# Patient Record
Sex: FEMALE | Race: BLACK OR AFRICAN AMERICAN | URBAN - METROPOLITAN AREA
[De-identification: names, ages, dates, MRNs, and addresses within clinical notes are randomized per-mention and may not be internally consistent; named-entity substitution may affect disease eponyms.]

---

## 2020-06-09 ENCOUNTER — WEB ENCOUNTER (OUTPATIENT)
Dept: URBAN - METROPOLITAN AREA CLINIC 98 | Facility: CLINIC | Age: 55
End: 2020-06-09

## 2020-06-12 ENCOUNTER — TELEPHONE ENCOUNTER (OUTPATIENT)
Dept: URBAN - METROPOLITAN AREA CLINIC 92 | Facility: CLINIC | Age: 55
End: 2020-06-12

## 2020-08-11 ENCOUNTER — LAB OUTSIDE AN ENCOUNTER (OUTPATIENT)
Dept: URBAN - METROPOLITAN AREA CLINIC 98 | Facility: CLINIC | Age: 55
End: 2020-08-11

## 2020-08-12 LAB
A/G RATIO: 1.6
ALBUMIN: 4.5
ALKALINE PHOSPHATASE: 239
ALT (SGPT): 21
AST (SGOT): 31
BILIRUBIN, TOTAL: 0.6
BUN/CREATININE RATIO: 10
BUN: 9
CALCIUM: 9.8
CARBON DIOXIDE, TOTAL: 24
CHLORIDE: 103
CHOLESTEROL, TOTAL: 213
COMMENT:: (no result)
CREATININE: 0.87
EGFR IF AFRICN AM: 87
EGFR IF NONAFRICN AM: 76
GGT: 99
GLOBULIN, TOTAL: 2.9
GLUCOSE: 88
HDL CHOLESTEROL: 119
HEMATOCRIT: 40
HEMOGLOBIN: 13.5
LDL CHOLESTEROL CALC: 83
MCH: 29.6
MCHC: 33.8
MCV: 88
NRBC: (no result)
PLATELETS: 346
POTASSIUM: 4.4
PROTEIN, TOTAL: 7.4
RBC: 4.56
RDW: 12.7
SODIUM: 141
TRIGLYCERIDES: 57
VITAMIN D, 25-HYDROXY: 29.4
VLDL CHOLESTEROL CAL: 11
WBC: 3.6

## 2020-08-17 ENCOUNTER — OFFICE VISIT (OUTPATIENT)
Dept: URBAN - METROPOLITAN AREA CLINIC 98 | Facility: CLINIC | Age: 55
End: 2020-08-17
Payer: COMMERCIAL

## 2020-08-17 DIAGNOSIS — R79.89 LOW VITAMIN D LEVEL: ICD-10-CM

## 2020-08-17 DIAGNOSIS — E78.5 DYSLIPIDEMIA: ICD-10-CM

## 2020-08-17 DIAGNOSIS — D86.9 SARCOIDOSIS: ICD-10-CM

## 2020-08-17 PROCEDURE — G9903 PT SCRN TBCO ID AS NON USER: HCPCS | Performed by: INTERNAL MEDICINE

## 2020-08-17 PROCEDURE — 3017F COLORECTAL CA SCREEN DOC REV: CPT | Performed by: INTERNAL MEDICINE

## 2020-08-17 PROCEDURE — G8417 CALC BMI ABV UP PARAM F/U: HCPCS | Performed by: INTERNAL MEDICINE

## 2020-08-17 PROCEDURE — G8427 DOCREV CUR MEDS BY ELIG CLIN: HCPCS | Performed by: INTERNAL MEDICINE

## 2020-08-17 PROCEDURE — 99213 OFFICE O/P EST LOW 20 MIN: CPT | Performed by: INTERNAL MEDICINE

## 2020-08-17 RX ORDER — PRAVASTATIN SODIUM 20 MG/1
TAKE 1 TABLET (20 MG) BY ORAL ROUTE ONCE DAILY AT BEDTIME FOR 90 DAYS TABLET ORAL 1
Qty: 90 | Refills: 3 | Status: ACTIVE | COMMUNITY
Start: 2020-02-15 | End: 2021-02-09

## 2020-08-17 RX ORDER — URSODIOL 250 MG/1
TAKE 2 TABLETS BY ORAL ROUTE 2 TIMES A DAY FOR 90 DAYS TABLET ORAL 2
Qty: 360 | Refills: 3 | Status: ACTIVE | COMMUNITY
Start: 2020-02-14 | End: 2021-02-08

## 2020-08-17 NOTE — HPI-TODAY'S VISIT:
Here for routine f/u  grandmother passed she has been under stress  not eating as she should  working remotely no new medications or medical issues.

## 2020-08-18 ENCOUNTER — OFFICE VISIT (OUTPATIENT)
Dept: URBAN - METROPOLITAN AREA CLINIC 98 | Facility: CLINIC | Age: 55
End: 2020-08-18

## 2020-12-31 ENCOUNTER — ERX REFILL RESPONSE (OUTPATIENT)
Age: 55
End: 2020-12-31

## 2020-12-31 RX ORDER — PRAVASTATIN SODIUM 20 MG/1
TAKE 1 TABLET(20 MG) BY MOUTH EVERY DAY AT BEDTIME TABLET ORAL
Qty: 90 | Refills: 3

## 2021-02-11 LAB
A/G RATIO: 1.4
ALBUMIN: 4.5
ALKALINE PHOSPHATASE: 236
ALT (SGPT): 27
AST (SGOT): 37
BASO (ABSOLUTE): 0
BASOS: 1
BILIRUBIN, TOTAL: 0.6
BUN/CREATININE RATIO: 9
BUN: 8
CALCIUM: 9.9
CARBON DIOXIDE, TOTAL: 24
CHLORIDE: 105
CREATININE: 0.88
EGFR IF AFRICN AM: 86
EGFR IF NONAFRICN AM: 74
EOS (ABSOLUTE): 0.1
EOS: 2
GGT: 83
GLOBULIN, TOTAL: 3.2
GLUCOSE: 96
HEMATOCRIT: 39.9
HEMATOLOGY COMMENTS:: (no result)
HEMOGLOBIN: 13.1
IMMATURE CELLS: (no result)
IMMATURE GRANS (ABS): 0
IMMATURE GRANULOCYTES: 0
LYMPHS (ABSOLUTE): 1.1
LYMPHS: 33
MCH: 28.7
MCHC: 32.8
MCV: 87
MONOCYTES(ABSOLUTE): 0.4
MONOCYTES: 12
NEUTROPHILS (ABSOLUTE): 1.8
NEUTROPHILS: 52
NRBC: (no result)
PLATELETS: 363
POTASSIUM: 4.6
PROTEIN, TOTAL: 7.7
RBC: 4.57
RDW: 12.9
SODIUM: 145
VITAMIN D, 25-HYDROXY: 24.3
WBC: 3.4

## 2021-02-17 ENCOUNTER — LAB OUTSIDE AN ENCOUNTER (OUTPATIENT)
Dept: URBAN - METROPOLITAN AREA CLINIC 98 | Facility: CLINIC | Age: 56
End: 2021-02-17

## 2021-02-18 ENCOUNTER — OFFICE VISIT (OUTPATIENT)
Dept: URBAN - METROPOLITAN AREA CLINIC 98 | Facility: CLINIC | Age: 56
End: 2021-02-18
Payer: COMMERCIAL

## 2021-02-18 ENCOUNTER — ERX REFILL RESPONSE (OUTPATIENT)
Age: 56
End: 2021-02-18

## 2021-02-18 DIAGNOSIS — D86.9 SARCOIDOSIS: ICD-10-CM

## 2021-02-18 DIAGNOSIS — R79.89 LOW VITAMIN D LEVEL: ICD-10-CM

## 2021-02-18 DIAGNOSIS — E78.5 DYSLIPIDEMIA: ICD-10-CM

## 2021-02-18 PROCEDURE — G8482 FLU IMMUNIZE ORDER/ADMIN: HCPCS | Performed by: INTERNAL MEDICINE

## 2021-02-18 PROCEDURE — G8427 DOCREV CUR MEDS BY ELIG CLIN: HCPCS | Performed by: INTERNAL MEDICINE

## 2021-02-18 PROCEDURE — 99214 OFFICE O/P EST MOD 30 MIN: CPT | Performed by: INTERNAL MEDICINE

## 2021-02-18 PROCEDURE — G8420 CALC BMI NORM PARAMETERS: HCPCS | Performed by: INTERNAL MEDICINE

## 2021-02-18 PROCEDURE — 3017F COLORECTAL CA SCREEN DOC REV: CPT | Performed by: INTERNAL MEDICINE

## 2021-02-18 PROCEDURE — G9903 PT SCRN TBCO ID AS NON USER: HCPCS | Performed by: INTERNAL MEDICINE

## 2021-02-18 RX ORDER — ERGOCALCIFEROL 1.25 MG/1
TAKE 1 CAPSULE BY MOUTH EVERY WEEK CAPSULE, LIQUID FILLED ORAL
Qty: 8 | Refills: 0

## 2021-02-18 RX ORDER — PRAVASTATIN SODIUM 20 MG/1
TAKE 1 TABLET(20 MG) BY MOUTH EVERY DAY AT BEDTIME TABLET ORAL
Qty: 90 | Refills: 3 | Status: ACTIVE | COMMUNITY

## 2021-02-18 RX ORDER — AMLODIPINE BESYLATE 2.5 MG
1 TABLET TABLET ORAL ONCE A DAY
Status: ACTIVE | COMMUNITY

## 2021-08-16 ENCOUNTER — OFFICE VISIT (OUTPATIENT)
Dept: URBAN - METROPOLITAN AREA CLINIC 98 | Facility: CLINIC | Age: 56
End: 2021-08-16

## 2021-08-18 ENCOUNTER — LAB OUTSIDE AN ENCOUNTER (OUTPATIENT)
Dept: URBAN - METROPOLITAN AREA CLINIC 98 | Facility: CLINIC | Age: 56
End: 2021-08-18

## 2021-09-24 LAB
A/G RATIO: 1.5
ALBUMIN: 4.5
ALKALINE PHOSPHATASE: 275
ALT (SGPT): 33
AST (SGOT): 39
BASO (ABSOLUTE): 0
BASOS: 1
BILIRUBIN, TOTAL: 0.5
BUN/CREATININE RATIO: 7
BUN: 7
CALCIUM: 9.9
CARBON DIOXIDE, TOTAL: 24
CHLORIDE: 103
CHOLESTEROL, TOTAL: 228
COMMENT:: (no result)
CREATININE: 0.94
EGFR IF AFRICN AM: 78
EGFR IF NONAFRICN AM: 68
EOS (ABSOLUTE): 0.1
EOS: 3
GGT: 107
GLOBULIN, TOTAL: 3.1
GLUCOSE: 91
HDL CHOLESTEROL: 113
HEMATOCRIT: 42.4
HEMATOLOGY COMMENTS:: (no result)
HEMOGLOBIN: 13.3
IMMATURE CELLS: (no result)
IMMATURE GRANS (ABS): 0
IMMATURE GRANULOCYTES: 0
LDL CHOL CALC (NIH): 105
LYMPHS (ABSOLUTE): 1.1
LYMPHS: 33
MCH: 28.1
MCHC: 31.4
MCV: 90
MONOCYTES(ABSOLUTE): 0.4
MONOCYTES: 12
NEUTROPHILS (ABSOLUTE): 1.8
NEUTROPHILS: 51
NRBC: (no result)
PLATELETS: 381
POTASSIUM: 4.4
PROTEIN, TOTAL: 7.6
RBC: 4.74
RDW: 13.1
SODIUM: 141
TRIGLYCERIDES: 59
VITAMIN D, 25-HYDROXY: 26.4
VLDL CHOLESTEROL CAL: 10
WBC: 3.5

## 2021-09-30 ENCOUNTER — OFFICE VISIT (OUTPATIENT)
Dept: URBAN - METROPOLITAN AREA CLINIC 98 | Facility: CLINIC | Age: 56
End: 2021-09-30
Payer: COMMERCIAL

## 2021-09-30 DIAGNOSIS — D86.9 SARCOIDOSIS: ICD-10-CM

## 2021-09-30 DIAGNOSIS — R79.89 LOW VITAMIN D LEVEL: ICD-10-CM

## 2021-09-30 DIAGNOSIS — E78.5 DYSLIPIDEMIA: ICD-10-CM

## 2021-09-30 PROCEDURE — 99214 OFFICE O/P EST MOD 30 MIN: CPT | Performed by: INTERNAL MEDICINE

## 2021-09-30 RX ORDER — ERGOCALCIFEROL 1.25 MG/1
TAKE 1 CAPSULE BY MOUTH EVERY WEEK CAPSULE, LIQUID FILLED ORAL
Qty: 8 | Refills: 0

## 2021-09-30 RX ORDER — ERGOCALCIFEROL 1.25 MG/1
TAKE 1 CAPSULE BY MOUTH EVERY WEEK CAPSULE, LIQUID FILLED ORAL
Qty: 8 | Refills: 0 | Status: ON HOLD | COMMUNITY

## 2021-09-30 RX ORDER — AMLODIPINE BESYLATE 2.5 MG
1 TABLET TABLET ORAL ONCE A DAY
Status: ACTIVE | COMMUNITY

## 2021-09-30 RX ORDER — PRAVASTATIN SODIUM 20 MG/1
TAKE 1 TABLET(20 MG) BY MOUTH EVERY DAY AT BEDTIME TABLET ORAL
Qty: 90 | Refills: 3 | Status: ACTIVE | COMMUNITY

## 2021-09-30 RX ORDER — URSODIOL 250 MG/1
TAKE 2 TABLETS BY ORAL ROUTE 2 TIMES A DAY FOR 90 DAYS TABLET ORAL 2
Qty: 360 | Refills: 3
Start: 2020-02-14

## 2021-09-30 NOTE — HPI-TODAY'S VISIT:
every other day- going to gym.   doing 10k steps however gained some weight : has had several diet indiscretions.

## 2022-03-30 ENCOUNTER — LAB OUTSIDE AN ENCOUNTER (OUTPATIENT)
Dept: URBAN - METROPOLITAN AREA CLINIC 98 | Facility: CLINIC | Age: 57
End: 2022-03-30

## 2022-04-18 ENCOUNTER — TELEPHONE ENCOUNTER (OUTPATIENT)
Dept: URBAN - METROPOLITAN AREA CLINIC 98 | Facility: CLINIC | Age: 57
End: 2022-04-18

## 2022-04-19 LAB
A/G RATIO: 1.5
ALBUMIN: 4.6
ALKALINE PHOSPHATASE: 275
ALT (SGPT): 34
AST (SGOT): 40
BASO (ABSOLUTE): 0
BASOS: 1
BILIRUBIN, TOTAL: 0.4
BUN/CREATININE RATIO: 12
BUN: 12
CALCIUM: 9.9
CARBON DIOXIDE, TOTAL: 23
CHLORIDE: 103
CHOLESTEROL, TOTAL: 220
COMMENT:: (no result)
CREATININE: 1.04
EGFR: 63
EOS (ABSOLUTE): 0.1
EOS: 3
GGT: 106
GLOBULIN, TOTAL: 3
GLUCOSE: 101
HDL CHOLESTEROL: 104
HEMATOCRIT: 42.4
HEMATOLOGY COMMENTS:: (no result)
HEMOGLOBIN: 13.8
IMMATURE CELLS: (no result)
IMMATURE GRANS (ABS): 0
IMMATURE GRANULOCYTES: 0
LDL CHOL CALC (NIH): 105
LYMPHS (ABSOLUTE): 1.3
LYMPHS: 36
MCH: 29.7
MCHC: 32.5
MCV: 91
MONOCYTES(ABSOLUTE): 0.4
MONOCYTES: 12
NEUTROPHILS (ABSOLUTE): 1.7
NEUTROPHILS: 48
NRBC: (no result)
PLATELETS: 397
POTASSIUM: 4.5
PROTEIN, TOTAL: 7.6
RBC: 4.64
RDW: 13.1
SODIUM: 140
TRIGLYCERIDES: 60
VITAMIN D, 25-HYDROXY: 27.2
VLDL CHOLESTEROL CAL: 11
WBC: 3.6

## 2022-04-25 ENCOUNTER — OFFICE VISIT (OUTPATIENT)
Dept: URBAN - METROPOLITAN AREA CLINIC 98 | Facility: CLINIC | Age: 57
End: 2022-04-25

## 2022-05-20 ENCOUNTER — OFFICE VISIT (OUTPATIENT)
Dept: URBAN - METROPOLITAN AREA CLINIC 98 | Facility: CLINIC | Age: 57
End: 2022-05-20
Payer: COMMERCIAL

## 2022-05-20 ENCOUNTER — WEB ENCOUNTER (OUTPATIENT)
Dept: URBAN - METROPOLITAN AREA CLINIC 98 | Facility: CLINIC | Age: 57
End: 2022-05-20

## 2022-05-20 VITALS
TEMPERATURE: 98.2 F | DIASTOLIC BLOOD PRESSURE: 77 MMHG | HEIGHT: 66 IN | WEIGHT: 199.8 LBS | HEART RATE: 84 BPM | BODY MASS INDEX: 32.11 KG/M2 | SYSTOLIC BLOOD PRESSURE: 130 MMHG

## 2022-05-20 DIAGNOSIS — R79.89 LOW VITAMIN D LEVEL: ICD-10-CM

## 2022-05-20 DIAGNOSIS — D86.9 SARCOIDOSIS: ICD-10-CM

## 2022-05-20 DIAGNOSIS — E78.5 DYSLIPIDEMIA: ICD-10-CM

## 2022-05-20 PROBLEM — 370992007 DYSLIPIDEMIA: Status: ACTIVE | Noted: 2021-09-29

## 2022-05-20 PROCEDURE — 99213 OFFICE O/P EST LOW 20 MIN: CPT | Performed by: INTERNAL MEDICINE

## 2022-05-20 RX ORDER — ERGOCALCIFEROL 1.25 MG/1
TAKE 1 CAPSULE BY MOUTH EVERY WEEK CAPSULE, LIQUID FILLED ORAL
Qty: 8 | Refills: 0

## 2022-05-20 RX ORDER — AMLODIPINE BESYLATE 2.5 MG
1 TABLET TABLET ORAL ONCE A DAY
Status: ACTIVE | COMMUNITY

## 2022-05-20 RX ORDER — URSODIOL 250 MG/1
TAKE 2 TABLETS BY ORAL ROUTE 2 TIMES A DAY FOR 90 DAYS TABLET ORAL 2
Qty: 360 | Refills: 3 | Status: ACTIVE | COMMUNITY
Start: 2020-02-14

## 2022-05-20 RX ORDER — PRAVASTATIN SODIUM 20 MG/1
TAKE 1 TABLET(20 MG) BY MOUTH EVERY DAY AT BEDTIME TABLET ORAL
Qty: 90 | Refills: 3 | Status: ACTIVE | COMMUNITY

## 2022-05-20 RX ORDER — ERGOCALCIFEROL 1.25 MG/1
TAKE 1 CAPSULE BY MOUTH EVERY WEEK CAPSULE, LIQUID FILLED ORAL
Qty: 8 | Refills: 0 | Status: ACTIVE | COMMUNITY

## 2022-11-20 ENCOUNTER — LAB OUTSIDE AN ENCOUNTER (OUTPATIENT)
Dept: URBAN - METROPOLITAN AREA CLINIC 98 | Facility: CLINIC | Age: 57
End: 2022-11-20

## 2022-11-29 ENCOUNTER — OFFICE VISIT (OUTPATIENT)
Dept: URBAN - METROPOLITAN AREA CLINIC 98 | Facility: CLINIC | Age: 57
End: 2022-11-29

## 2023-01-06 ENCOUNTER — OFFICE VISIT (OUTPATIENT)
Dept: URBAN - METROPOLITAN AREA CLINIC 98 | Facility: CLINIC | Age: 58
End: 2023-01-06

## 2023-01-19 ENCOUNTER — TELEPHONE ENCOUNTER (OUTPATIENT)
Dept: URBAN - METROPOLITAN AREA CLINIC 63 | Facility: CLINIC | Age: 58
End: 2023-01-19

## 2023-01-26 ENCOUNTER — ERX REFILL RESPONSE (OUTPATIENT)
Dept: URBAN - METROPOLITAN AREA CLINIC 98 | Facility: CLINIC | Age: 58
End: 2023-01-26

## 2023-01-26 RX ORDER — URSODIOL 250 MG/1
TAKE 2 TABLETS BY ORAL ROUTE 2 TIMES A DAY FOR 90 DAYS TABLET ORAL 2
Qty: 360 | Refills: 3 | OUTPATIENT

## 2023-03-21 LAB
A/G RATIO: 1.4
ALBUMIN: 4.2
ALKALINE PHOSPHATASE: 236
ALT (SGPT): 31
AST (SGOT): 37
BASO (ABSOLUTE): 0
BASOS: 1
BILIRUBIN, TOTAL: 0.5
BUN/CREATININE RATIO: 16
BUN: 13
CALCIUM: 9.3
CARBON DIOXIDE, TOTAL: 24
CHLORIDE: 105
CHOLESTEROL, TOTAL: 236
COMMENT:: (no result)
CREATININE: 0.79
EGFR: 87
EOS (ABSOLUTE): 0.1
EOS: 4
GGT: 95
GLOBULIN, TOTAL: 2.9
GLUCOSE: 101
HDL CHOLESTEROL: 99
HEMATOCRIT: 40.8
HEMATOLOGY COMMENTS:: (no result)
HEMOGLOBIN: 13.8
IMMATURE CELLS: (no result)
IMMATURE GRANS (ABS): 0
IMMATURE GRANULOCYTES: 0
LDL CHOL CALC (NIH): 126
LYMPHS (ABSOLUTE): 1.2
LYMPHS: 31
MCH: 30.1
MCHC: 33.8
MCV: 89
MONOCYTES(ABSOLUTE): 0.4
MONOCYTES: 11
NEUTROPHILS (ABSOLUTE): 2.1
NEUTROPHILS: 53
NRBC: (no result)
PLATELETS: 335
POTASSIUM: 4.5
PROTEIN, TOTAL: 7.1
RBC: 4.58
RDW: 13.1
SODIUM: 145
TRIGLYCERIDES: 68
VITAMIN D, 25-HYDROXY: 34.4
VLDL CHOLESTEROL CAL: 11
WBC: 3.9

## 2023-03-27 ENCOUNTER — LAB OUTSIDE AN ENCOUNTER (OUTPATIENT)
Dept: URBAN - METROPOLITAN AREA CLINIC 98 | Facility: CLINIC | Age: 58
End: 2023-03-27

## 2023-03-27 ENCOUNTER — DASHBOARD ENCOUNTERS (OUTPATIENT)
Age: 58
End: 2023-03-27

## 2023-03-27 ENCOUNTER — OFFICE VISIT (OUTPATIENT)
Dept: URBAN - METROPOLITAN AREA CLINIC 98 | Facility: CLINIC | Age: 58
End: 2023-03-27
Payer: COMMERCIAL

## 2023-03-27 VITALS
HEART RATE: 70 BPM | WEIGHT: 209.4 LBS | BODY MASS INDEX: 33.65 KG/M2 | DIASTOLIC BLOOD PRESSURE: 83 MMHG | SYSTOLIC BLOOD PRESSURE: 184 MMHG | TEMPERATURE: 97.4 F | HEIGHT: 66 IN

## 2023-03-27 DIAGNOSIS — E78.5 DYSLIPIDEMIA: ICD-10-CM

## 2023-03-27 DIAGNOSIS — F43.21 GRIEF REACTION: ICD-10-CM

## 2023-03-27 DIAGNOSIS — D86.9 SARCOIDOSIS: ICD-10-CM

## 2023-03-27 DIAGNOSIS — R79.89 LOW VITAMIN D LEVEL: ICD-10-CM

## 2023-03-27 PROBLEM — 224965009: Status: ACTIVE | Noted: 2023-03-27

## 2023-03-27 PROCEDURE — 99214 OFFICE O/P EST MOD 30 MIN: CPT | Performed by: INTERNAL MEDICINE

## 2023-03-27 RX ORDER — PRAVASTATIN SODIUM 20 MG/1
TAKE 1 TABLET(20 MG) BY MOUTH EVERY DAY AT BEDTIME TABLET ORAL
Qty: 90 | Refills: 3 | Status: ON HOLD | COMMUNITY

## 2023-03-27 RX ORDER — ERGOCALCIFEROL 1.25 MG/1
TAKE 1 CAPSULE BY MOUTH EVERY WEEK CAPSULE, LIQUID FILLED ORAL
Qty: 8 | Refills: 0 | Status: ACTIVE | COMMUNITY

## 2023-03-27 RX ORDER — URSODIOL 250 MG/1
TAKE 2 TABLETS BY ORAL ROUTE 2 TIMES A DAY FOR 90 DAYS TABLET ORAL 2
Qty: 360 | Refills: 3 | Status: ACTIVE | COMMUNITY

## 2023-03-27 RX ORDER — AMLODIPINE BESYLATE 2.5 MG
1 TABLET TABLET ORAL ONCE A DAY
Status: ON HOLD | COMMUNITY

## 2023-03-27 NOTE — HPI-TODAY'S VISIT:
Mother just passed away  Lots of events as well - she was rowing but then had to take care of mother  Got heart and pulmonary doctors : thinking sarcoidosis is progressing [place] records brought in by pt reviewed.  including :holter episodic sinus tac, rare pac, rare pvc single nsvt x8 beats   . Dr Bebo Smith  (Ortho)  Dr Fred Velasquez - Cardiology  Dr Elio Ochoa - EP  Dr Josiah Andrade - [facility]

## 2023-03-28 LAB
A/G RATIO: 1.5
ALBUMIN: 4.4
ALKALINE PHOSPHATASE: 259
ALT (SGPT): 29
AST (SGOT): 35
BASO (ABSOLUTE): 0
BASOS: 0
BILIRUBIN, TOTAL: 0.6
BUN/CREATININE RATIO: 16
BUN: 12
CALCIUM: 9.7
CARBON DIOXIDE, TOTAL: 22
CHLORIDE: 104
CREATININE: 0.77
EGFR: 90
EOS (ABSOLUTE): 0.2
EOS: 3
GGT: 106
GLOBULIN, TOTAL: 3
GLUCOSE: 82
HEMATOCRIT: 41.5
HEMATOLOGY COMMENTS:: (no result)
HEMOGLOBIN: 13.9
IMMATURE CELLS: (no result)
IMMATURE GRANS (ABS): 0
IMMATURE GRANULOCYTES: 0
LYMPHS (ABSOLUTE): 1.3
LYMPHS: 27
MCH: 29.3
MCHC: 33.5
MCV: 88
MONOCYTES(ABSOLUTE): 0.5
MONOCYTES: 10
NEUTROPHILS (ABSOLUTE): 2.9
NEUTROPHILS: 60
NRBC: (no result)
PLATELETS: 348
POTASSIUM: 4.5
PROTEIN, TOTAL: 7.4
RBC: 4.74
RDW: 13
SODIUM: 142
VITAMIN D, 25-HYDROXY: 31.3
WBC: 4.7

## 2023-03-29 ENCOUNTER — TELEPHONE ENCOUNTER (OUTPATIENT)
Dept: URBAN - METROPOLITAN AREA CLINIC 35 | Facility: CLINIC | Age: 58
End: 2023-03-29

## 2023-09-25 ENCOUNTER — OFFICE VISIT (OUTPATIENT)
Dept: URBAN - METROPOLITAN AREA CLINIC 98 | Facility: CLINIC | Age: 58
End: 2023-09-25

## 2024-07-07 ENCOUNTER — LAB OUTSIDE AN ENCOUNTER (OUTPATIENT)
Dept: URBAN - METROPOLITAN AREA CLINIC 98 | Facility: CLINIC | Age: 59
End: 2024-07-07

## 2024-07-07 ENCOUNTER — WEB ENCOUNTER (OUTPATIENT)
Dept: URBAN - METROPOLITAN AREA CLINIC 98 | Facility: CLINIC | Age: 59
End: 2024-07-07

## 2024-07-11 LAB
ALBUMIN: 4.3
ALKALINE PHOSPHATASE: 325
ALT (SGPT): 29
AST (SGOT): 35
BASO (ABSOLUTE): 0
BASOS: 1
BILIRUBIN, TOTAL: 0.7
BUN/CREATININE RATIO: 11
BUN: 10
CALCIUM: 9.7
CARBON DIOXIDE, TOTAL: 23
CHLORIDE: 106
CREATININE: 0.88
EGFR: 76
EOS (ABSOLUTE): 0.2
EOS: 4
GGT: 145
GLOBULIN, TOTAL: 3.2
GLUCOSE: 97
HEMATOCRIT: 41.5
HEMATOLOGY COMMENTS:: (no result)
HEMOGLOBIN: 13.6
IMMATURE CELLS: (no result)
IMMATURE GRANS (ABS): 0
IMMATURE GRANULOCYTES: 0
LYMPHS (ABSOLUTE): 1.2
LYMPHS: 31
MCH: 29.4
MCHC: 32.8
MCV: 90
MONOCYTES(ABSOLUTE): 0.5
MONOCYTES: 12
NEUTROPHILS (ABSOLUTE): 2
NEUTROPHILS: 52
NRBC: (no result)
PLATELETS: 331
POTASSIUM: 4.6
PROTEIN, TOTAL: 7.5
RBC: 4.62
RDW: 13.5
SODIUM: 143
VITAMIN D, 25-HYDROXY: 34
WBC: 3.9

## 2024-07-15 ENCOUNTER — OFFICE VISIT (OUTPATIENT)
Dept: URBAN - METROPOLITAN AREA CLINIC 98 | Facility: CLINIC | Age: 59
End: 2024-07-15
Payer: COMMERCIAL

## 2024-07-15 VITALS
HEIGHT: 66 IN | WEIGHT: 211 LBS | TEMPERATURE: 98.1 F | SYSTOLIC BLOOD PRESSURE: 170 MMHG | BODY MASS INDEX: 33.91 KG/M2 | DIASTOLIC BLOOD PRESSURE: 81 MMHG | HEART RATE: 71 BPM

## 2024-07-15 DIAGNOSIS — R79.89 LOW VITAMIN D LEVEL: ICD-10-CM

## 2024-07-15 DIAGNOSIS — D86.9 SARCOIDOSIS: ICD-10-CM

## 2024-07-15 PROCEDURE — 99213 OFFICE O/P EST LOW 20 MIN: CPT | Performed by: INTERNAL MEDICINE

## 2024-07-15 RX ORDER — ERGOCALCIFEROL 1.25 MG/1
TAKE 1 CAPSULE BY MOUTH EVERY WEEK CAPSULE, LIQUID FILLED ORAL
Qty: 8 | Refills: 0 | Status: ON HOLD | COMMUNITY

## 2024-07-15 RX ORDER — PRAVASTATIN SODIUM 20 MG/1
1 TABLET TABLET ORAL ONCE A DAY
Status: ACTIVE | COMMUNITY

## 2024-07-15 RX ORDER — METFORMIN HYDROCHLORIDE 500 MG/1
1 TABLET WITH A MEAL TABLET, FILM COATED ORAL ONCE A DAY
Status: ACTIVE | COMMUNITY

## 2024-07-15 RX ORDER — SOLIFENACIN SUCCINATE 10 MG/1
1 TABLET TABLET, FILM COATED ORAL ONCE A DAY
Status: ACTIVE | COMMUNITY

## 2024-07-15 RX ORDER — URSODIOL 250 MG/1
TAKE 2 TABLETS BY ORAL ROUTE 2 TIMES A DAY FOR 90 DAYS TABLET ORAL 2
Qty: 360 | Refills: 3 | Status: ACTIVE | COMMUNITY

## 2024-07-15 RX ORDER — AMLODIPINE BESYLATE 2.5 MG
1 TABLET TABLET ORAL ONCE A DAY
Status: ACTIVE | COMMUNITY

## 2024-07-15 NOTE — HPI-TODAY'S VISIT:
Here to follow up  took awhile but now finally over the grief reaction from death of her mother  Dr Ballard - did cardiac MRI  did loop recorder - seeing him again Aug 8 - considering cardiac ablation  she feels faint from time to time  HR up to 145 at rest  on a weight loss program Richard

## 2025-01-07 ENCOUNTER — OFFICE VISIT (OUTPATIENT)
Dept: URBAN - METROPOLITAN AREA CLINIC 98 | Facility: CLINIC | Age: 60
End: 2025-01-07

## 2025-01-15 ENCOUNTER — LAB OUTSIDE AN ENCOUNTER (OUTPATIENT)
Dept: URBAN - METROPOLITAN AREA CLINIC 98 | Facility: CLINIC | Age: 60
End: 2025-01-15

## 2025-01-30 LAB
ALBUMIN: 4.5
ALKALINE PHOSPHATASE: 301
ALT (SGPT): 33
AST (SGOT): 41
BASO (ABSOLUTE): 0
BASOS: 1
BILIRUBIN, TOTAL: 0.7
BUN/CREATININE RATIO: 16
BUN: 14
CALCIUM: 10.2
CARBON DIOXIDE, TOTAL: 23
CHLORIDE: 103
CREATININE: 0.89
EGFR: 75
EOS (ABSOLUTE): 0.1
EOS: 2
GGT: 76
GLOBULIN, TOTAL: 3.4
GLUCOSE: 97
HEMATOCRIT: 43
HEMATOLOGY COMMENTS:: (no result)
HEMOGLOBIN: 14
IMMATURE CELLS: (no result)
IMMATURE GRANS (ABS): 0
IMMATURE GRANULOCYTES: 0
LYMPHS (ABSOLUTE): 1.2
LYMPHS: 35
MCH: 29.6
MCHC: 32.6
MCV: 91
MONOCYTES(ABSOLUTE): 0.4
MONOCYTES: 12
NEUTROPHILS (ABSOLUTE): 1.7
NEUTROPHILS: 50
NRBC: (no result)
PLATELETS: 371
POTASSIUM: 4.7
PROTEIN, TOTAL: 7.9
RBC: 4.73
RDW: 13.3
SODIUM: 140
VITAMIN D, 25-HYDROXY: 32.5
WBC: 3.5

## 2025-02-04 ENCOUNTER — LAB OUTSIDE AN ENCOUNTER (OUTPATIENT)
Dept: URBAN - METROPOLITAN AREA CLINIC 98 | Facility: CLINIC | Age: 60
End: 2025-02-04

## 2025-02-04 ENCOUNTER — OFFICE VISIT (OUTPATIENT)
Dept: URBAN - METROPOLITAN AREA CLINIC 98 | Facility: CLINIC | Age: 60
End: 2025-02-04
Payer: COMMERCIAL

## 2025-02-04 VITALS
SYSTOLIC BLOOD PRESSURE: 146 MMHG | TEMPERATURE: 96.6 F | WEIGHT: 207 LBS | BODY MASS INDEX: 33.27 KG/M2 | DIASTOLIC BLOOD PRESSURE: 74 MMHG | HEART RATE: 76 BPM | HEIGHT: 66 IN

## 2025-02-04 DIAGNOSIS — D86.9 SARCOIDOSIS: ICD-10-CM

## 2025-02-04 DIAGNOSIS — R79.89 LOW VITAMIN D LEVEL: ICD-10-CM

## 2025-02-04 DIAGNOSIS — Z12.11 COLON CANCER SCREENING: ICD-10-CM

## 2025-02-04 PROCEDURE — 99214 OFFICE O/P EST MOD 30 MIN: CPT | Performed by: INTERNAL MEDICINE

## 2025-02-04 RX ORDER — ERGOCALCIFEROL 1.25 MG/1
TAKE 1 CAPSULE BY MOUTH EVERY WEEK CAPSULE, LIQUID FILLED ORAL
Qty: 8 | Refills: 0 | Status: ON HOLD | COMMUNITY

## 2025-02-04 RX ORDER — PRAVASTATIN SODIUM 20 MG/1
1 TABLET TABLET ORAL ONCE A DAY
Status: ON HOLD | COMMUNITY

## 2025-02-04 RX ORDER — SOLIFENACIN SUCCINATE 10 MG/1
1 TABLET TABLET, FILM COATED ORAL ONCE A DAY
Status: ACTIVE | COMMUNITY

## 2025-02-04 RX ORDER — AMLODIPINE BESYLATE 2.5 MG
1 TABLET TABLET ORAL ONCE A DAY
Status: ACTIVE | COMMUNITY

## 2025-02-04 RX ORDER — METFORMIN HYDROCHLORIDE 500 MG/1
1 TABLET WITH A MEAL TABLET, FILM COATED ORAL ONCE A DAY
Status: ACTIVE | COMMUNITY

## 2025-02-04 RX ORDER — URSODIOL 250 MG/1
TAKE 2 TABLETS BY ORAL ROUTE 2 TIMES A DAY FOR 90 DAYS TABLET ORAL 2
Qty: 360 | Refills: 3 | Status: ACTIVE | COMMUNITY

## 2025-02-04 NOTE — HPI-TODAY'S VISIT:
here to follow up  having pain in her knee : seeing ortho but workin [employer] weight loss  feels well otherwise

## 2025-02-25 ENCOUNTER — TELEPHONE ENCOUNTER (OUTPATIENT)
Dept: URBAN - METROPOLITAN AREA CLINIC 98 | Facility: CLINIC | Age: 60
End: 2025-02-25

## 2025-02-25 NOTE — HPI-TODAY'S VISIT:
US [place] 2/2025 \*Unknown; EXAM: US ABDOMEN COMPLETE W/ELASTOGRAPHY \~ CLINICAL INDICATION: unspec. Sarcoidosis. \~ TECHNIQUE: Axial and longitudinal images were obtained of the upper abdomen using real-time ultrasound. Elastography measurements were obtained. [id number] \~ COMPARISON: Remote MRI of 2016 \~ FINDINGS:  Liver: Diffusely coarsened echotexture of the liver. No lesions. Portal vein is patent. Hepatic veins are patent. \~ Bile Ducts: No dilated intrahepatic biliary radicles. Common bile duct measures 2.8 mm. \~ Gallbladder: Normal. Rubio's sign is negative. \~ Pancreas: Normal. \~ Right Kidney: Measures 10.1 cm. Normal echogenicity. No hydronephrosis. \~ Left Kidney: Measures 10.7 cm. Normal echogenicity. No hydronephrosis. \~ Spleen: Measures 9.0 cm. \~ Aorta: Normal caliber where imaged. \~ IVC: Normal proximally, not imaged distally.  \~ Other: None. \~ Shear wave elastography based on 10 measurements:  Mean: 7.6 kPa Median: 7.5 kPa Standard deviation: 0.52 kPa \~ IMPRESSION: \~ 1. Coarsened echotexture the liver suggesting changes of chronic liver disease. No focal liver lesion. 2. Elastography measurements as above. 3. Preserved antegrade hepatopedal portal venous flow. Normal spleen size. No ascites. \~ Source: [employer] White paper: LOGIQ E9 Shear Wave Elastography (March 2015) \~ Optimal elastography score cutoffs in terms of Young's Modulus (kPa)?for classification of liver fibrosis stage (METAVIR scale) in the patient population evaluated by [employer] using R5.1.0 equivalent software and C1-6-D probe. \~ Cutoff (kPa) F>0= 5.48 F>1= 8.29 F>2= 9.40 F>3= 11.9 \~ AUROC F>0= 0.87 F>1= 0.94 F>2= 0.98 F>3= 0.94 \~

## 2025-08-04 ENCOUNTER — LAB OUTSIDE AN ENCOUNTER (OUTPATIENT)
Dept: URBAN - METROPOLITAN AREA CLINIC 98 | Facility: CLINIC | Age: 60
End: 2025-08-04

## 2025-08-15 ENCOUNTER — OFFICE VISIT (OUTPATIENT)
Dept: URBAN - METROPOLITAN AREA CLINIC 98 | Facility: CLINIC | Age: 60
End: 2025-08-15